# Patient Record
Sex: FEMALE | Race: WHITE | URBAN - METROPOLITAN AREA
[De-identification: names, ages, dates, MRNs, and addresses within clinical notes are randomized per-mention and may not be internally consistent; named-entity substitution may affect disease eponyms.]

---

## 2021-10-26 ENCOUNTER — VIRTUAL VISIT (OUTPATIENT)
Dept: CARDIOLOGY | Facility: CLINIC | Age: 76
End: 2021-10-26
Attending: GENETIC COUNSELOR, MS
Payer: COMMERCIAL

## 2021-10-26 DIAGNOSIS — I71.019 THORACIC AORTIC DISSECTION (H): Primary | ICD-10-CM

## 2021-10-26 PROCEDURE — 96040 HC GENETIC COUNSELING, EACH 30 MINUTES: CPT | Mod: GT,95 | Performed by: GENETIC COUNSELOR, MS

## 2021-10-26 RX ORDER — LISINOPRIL 20 MG/1
20 TABLET ORAL
COMMUNITY
Start: 2021-09-17

## 2021-10-26 RX ORDER — POTASSIUM CHLORIDE 750 MG/1
TABLET, EXTENDED RELEASE ORAL
COMMUNITY
Start: 2021-03-15

## 2021-10-26 RX ORDER — ACETAMINOPHEN 500 MG
500-1000 TABLET ORAL ONCE
COMMUNITY

## 2021-10-26 RX ORDER — METOPROLOL SUCCINATE 100 MG/1
100 TABLET, EXTENDED RELEASE ORAL
COMMUNITY
Start: 2021-03-15

## 2021-10-26 RX ORDER — HYDROCODONE BITARTRATE AND ACETAMINOPHEN 5; 325 MG/1; MG/1
TABLET ORAL
COMMUNITY
Start: 2021-06-26

## 2021-10-26 RX ORDER — OXYBUTYNIN CHLORIDE 10 MG/1
TABLET, EXTENDED RELEASE ORAL
COMMUNITY
Start: 2021-09-15

## 2021-10-26 RX ORDER — ROSUVASTATIN CALCIUM 20 MG/1
20 TABLET, COATED ORAL
COMMUNITY
Start: 2021-03-15

## 2021-10-26 RX ORDER — ASCORBIC ACID 500 MG
500 TABLET ORAL
COMMUNITY

## 2021-10-26 RX ORDER — FUROSEMIDE 40 MG
40 TABLET ORAL
COMMUNITY
Start: 2021-03-15

## 2021-10-26 RX ORDER — VENLAFAXINE 75 MG/1
75 TABLET ORAL
COMMUNITY
Start: 2021-03-15

## 2021-10-26 NOTE — PROGRESS NOTES
"Here is a copy of the progress note from your recent genetic counseling visit through the [facility] on Date: 10/26/2021.  Due to Covid-19 pandemic, this appointment was moved to a virtual visit.    PROGRESS NOTE:Fe was referred by Dr. Auguste at [facility] for genetic counseling due to her  history of aortic dissection and the suspicion of thoracic aortic aneurysm and dissection (TAAD).  I had the opportunity to talk with Fe today to discuss the genetic component of TAAD and testing options available to her .     MEDICAL HISTORY: Fe was followed for years due to an aortic aneurysm.  She presented with an acute [place] type A aortic dissection in 2015. Her aneurysm size at that time was less than 5 cm. She underwent emergent surgical repair.   Fortunately she has done well over time and Dr. Auguste reports that follow-up CTAs of the aorta in , 2017 showed no residual evidence of the aortic dissection; that is, there was no evidence for a false lumen on any of the studies.     Due to the association between aortic aneurysms and connective tissue disorders, we also discussed related symptoms.  Fe states that she was 5'4\" at her tallest and has hypermobility, easy bruising as she has gotten older, and hiatal hernia after her surgery.  She denies any history of lens dislocation, scoliosis, sternum deformities, pneumothorax, stretch marks and poor wound healing.    Patient also has a history of fibromyalgia and breast cancer.    FAMILY HISTORY:A detailed family history was obtained during today's consult.  Family history was significant for the following cardiac history:    Father  at 49 years from an aortic rupture.  He was also known to have Hawthorn disease and was on high doses of steroids.  This was thought to have been related to his dissection.  He was 5'2\" and had not other connective tissue disorder symptoms.    Two " paternal aunts and two uncles  between 60-80, although little is known about cause of death.  One cousin reportedly has an aneurysm.    Paternal grandfather  in his 50's from a stroke.  Details about grandma are unknown.    Mother  at 58.  She had a heart attack and lived or 3 weeks before dying. Little is known about her 5 siblings but they all lived into their 70-80's.      Maternal grandfather  in his 60's from a suspected heart attack.  Grandmother  in her 80's.  There is no additional history of cardiomyopathy, arrhythmias, heart attacks, fainting, sudden cardiac death, genetic conditions, or birth defects. (A copy of pedigree may be found under media tab).    DISCUSSION:Explained that aortic aneurysm and dissections can be caused by both genetic and non-genetic factors, such as injuries, exposures, and high blood pressure.  Genetic causes include syndromes, such as Marfan and Loeys-Alexandra syndrome or as an isolated event that runs in families. 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     We reviewed autosomal dominant (AD) inheritance associated with inherited forms of TAAD including the 50% risk for recurrence. Explained reduced penetrance and variable expressivity, meaning that individuals who carry a gene mutation may or may not get the disease and onset and severity can vary from one family member to the next. This explains why you may not see TAAD each generation of a family.    Explained that gene testing is currently available for thoracic aortic aneurysms. The testing panel includes genes associated with both syndromic and isolated aortic aneurysms. Testing currently identifies mutations in about 20% of individuals with TAAD. Reviewed capabilities, limitations, and logistics of testing.  DNA sample via saliva or blood is collected and sent to testing lab for  evaluation of selected genes. The results could directly impact care and treatment.    Explained three possible outcomes of genetic testing including: positive identification of a mutation, no mutation identified, and identification of a variant of unknown significance (VUS). If a mutation is identified, presymptomatic testing would be available to at risk family members. If no mutation is identified, it does not rule out the possibility of a genetic component to this disease. Family members could still be at risk for developing the same condition. If a VUS is identified, it is unclear if the mutation is disease causing or just a normal variation. It may take time and possibly additional testing to determine the meaning of a VUS result.     Test results take approximately 2-4 weeks on average. Discussed cost of testing through commercial lab. Explained that the lab will work with insurance to determine coverage and contact patient if out of pocket costs are expected to exceed $100.     Discussed pros and cons of genetic testing. Explained that results could determine the cause for aortic aneurysm. If a mutation is identified, presymptomatic testing is available to all at risk relatives. Reviewed possible issues associated with presymptomatic testing including genetic discrimination, current laws to prevent discrimination (ie. JARED), insurance issues, and emotional and psychosocial outcomes of testing.     Recommend that first degree relatives, including parents, children, and siblings, be screened for aortic aneurysms.    All questions answered at this time.     PLAN:Fe elected to proceed with genetic testing.  Requisition and consent forms were completed and signed.  DNA will be collected via cheek swab sample and sent to [facility]  laboratory. I will contact patient when results are available.    TOTAL TIME SPENT IN COUNSELIN minutes    Angeles Keene MS, [facility]  Licensed, Certified Genetic Counselor  [facility]  [facility]

## 2021-10-26 NOTE — LETTER
"10/26/2021      RE: Fe Mcknight  [address]  [facility] [id number]       Dear Colleague,    Thank you for the opportunity to participate in the care of your patient, Fe Mcknight, at the [facility] [place] [facility]. Please see a copy of my visit note below.    Here is a copy of the progress note from your recent genetic counseling visit through the [facility] on Date: 10/26/2021.  Due to Covid-19 pandemic, this appointment was moved to a virtual visit.    PROGRESS NOTE:Fe was referred by Dr. Auguste at [facility] for genetic counseling due to her  history of aortic dissection and the suspicion of thoracic aortic aneurysm and dissection (TAAD).  I had the opportunity to talk with Fe today to discuss the genetic component of TAAD and testing options available to her .     MEDICAL HISTORY: Fe was followed for years due to an aortic aneurysm.  She presented with an acute [place] type A aortic dissection in October 2015. Her aneurysm size at that time was less than 5 cm. She underwent emergent surgical repair.   Fortunately she has done well over time and Dr. Auguste reports that follow-up CTAs of the aorta in 2016, 2017 2018 showed no residual evidence of the aortic dissection; that is, there was no evidence for a false lumen on any of the studies.     Due to the association between aortic aneurysms and connective tissue disorders, we also discussed related symptoms.  Fe states that she was 5'4\" at her tallest and has hypermobility, easy bruising as she has gotten older, and hiatal hernia after her surgery.  She denies any history of lens dislocation, scoliosis, sternum deformities, pneumothorax, stretch marks and poor wound healing.    Patient also has a history of fibromyalgia and breast cancer.    FAMILY HISTORY:A detailed family history was obtained " "during today's consult.  Family history was significant for the following cardiac history:    Father  at 49 years from an aortic rupture.  He was also known to have [place] disease and was on high doses of steroids.  This was thought to have been related to his dissection.  He was 5'2\" and had not other connective tissue disorder symptoms.    Two paternal aunts and two uncles  between 60-80, although little is known about cause of death.  One cousin reportedly has an aneurysm.    Paternal grandfather  in his 50's from a stroke.  Details about grandma are unknown.    Mother  at 58.  She had a heart attack and lived or 3 weeks before dying. Little is known about her 5 siblings but they all lived into their 70-80's.      Maternal grandfather  in his 60's from a suspected heart attack.  Grandmother  in her 80's.  There is no additional history of cardiomyopathy, arrhythmias, heart attacks, fainting, sudden cardiac death, genetic conditions, or birth defects. (A copy of pedigree may be found under media tab).    DISCUSSION:Explained that aortic aneurysm and dissections can be caused by both genetic and non-genetic factors, such as injuries, exposures, and high blood pressure.  Genetic causes include syndromes, such as Marfan and Loeys-Alexandra syndrome or as an isolated event that runs in families. 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     We reviewed autosomal dominant (AD) inheritance associated with inherited forms of TAAD including the 50% risk for recurrence. Explained reduced penetrance and variable expressivity, meaning that individuals who carry a gene mutation may or may not get the disease and onset and severity can vary from one family member to the next. This explains why you may not see TAAD each generation of a family.    Explained that gene testing is " currently available for thoracic aortic aneurysms. The testing panel includes genes associated with both syndromic and isolated aortic aneurysms. Testing currently identifies mutations in about 20% of individuals with TAAD. Reviewed capabilities, limitations, and logistics of testing.  DNA sample via saliva or blood is collected and sent to testing lab for evaluation of selected genes. The results could directly impact care and treatment.    Explained three possible outcomes of genetic testing including: positive identification of a mutation, no mutation identified, and identification of a variant of unknown significance (VUS). If a mutation is identified, presymptomatic testing would be available to at risk family members. If no mutation is identified, it does not rule out the possibility of a genetic component to this disease. Family members could still be at risk for developing the same condition. If a VUS is identified, it is unclear if the mutation is disease causing or just a normal variation. It may take time and possibly additional testing to determine the meaning of a VUS result.     Test results take approximately 2-4 weeks on average. Discussed cost of testing through commercial lab. Explained that the lab will work with insurance to determine coverage and contact patient if out of pocket costs are expected to exceed $100.     Discussed pros and cons of genetic testing. Explained that results could determine the cause for aortic aneurysm. If a mutation is identified, presymptomatic testing is available to all at risk relatives. Reviewed possible issues associated with presymptomatic testing including genetic discrimination, current laws to prevent discrimination (ie. JARED), insurance issues, and emotional and psychosocial outcomes of testing.     Recommend that first degree relatives, including parents, children, and siblings, be screened for aortic aneurysms.    All questions answered at this time.      PLAN:Fe elected to proceed with genetic testing.  Requisition and consent forms were completed and signed.  DNA will be collected via cheek swab sample and sent to [facility]  laboratory. I will contact patient when results are available.    TOTAL TIME SPENT IN COUNSELIN minutes          Please do not hesitate to contact me if you have any questions/concerns.     Sincerely,     Angeles Keene GC

## 2022-07-05 ENCOUNTER — TELEPHONE (OUTPATIENT)
Dept: CARDIOLOGY | Facility: CLINIC | Age: 77
End: 2022-07-05

## 2022-07-05 NOTE — TELEPHONE ENCOUNTER
Spoke with Fe today to review results of genetic testing. She underwent genetic testing on June 21, 2022 due to her diagnosis of aortic dissection.   Genetic testing for the TAAD panel thru [facility]  revealed that Fe does NOT carry a mutation in the 29 genes analyzed. It is predicted that this panel will identify mutations in 20-30% of TAAD cases.   Therefore, this negative result does not rule out a genetic basis for the dissection in Fe but eliminates the option of presymptomatic genetic testing in at risk family members, at this time. However, since this condition could still be genetic, clinical screening is recommended in all first degree relatives (children, siblings, parents).  Clinical screening should include cardiac exam and ECHO to assess their current status.   Explained that with continued research and genetic knowledge the detection rate for identifying mutations may increase over time. Therefore, it is worth touching base in the years to come to learn about new testing options.   A summary letter and copy of the results will be sent to patient. All questions answered at this time.   Angeles Keene MS, CGC  Licensed, Certified Genetic Counselor  [facility]  [facility]